# Patient Record
Sex: MALE | Race: WHITE | Employment: FULL TIME | URBAN - METROPOLITAN AREA
[De-identification: names, ages, dates, MRNs, and addresses within clinical notes are randomized per-mention and may not be internally consistent; named-entity substitution may affect disease eponyms.]

---

## 2024-02-07 ENCOUNTER — OFFICE VISIT (OUTPATIENT)
Dept: NEUROLOGY | Age: 29
End: 2024-02-07

## 2024-02-07 VITALS
HEART RATE: 91 BPM | OXYGEN SATURATION: 96 % | SYSTOLIC BLOOD PRESSURE: 118 MMHG | WEIGHT: 258 LBS | DIASTOLIC BLOOD PRESSURE: 78 MMHG

## 2024-02-07 DIAGNOSIS — G43.109 OCULAR MIGRAINE: Primary | ICD-10-CM

## 2024-02-07 DIAGNOSIS — Z75.8 DOES NOT HAVE PRIMARY CARE PROVIDER: ICD-10-CM

## 2024-02-07 RX ORDER — RIMEGEPANT SULFATE 75 MG/75MG
75 TABLET, ORALLY DISINTEGRATING ORAL
Qty: 9 TABLET | Refills: 3 | Status: SHIPPED | OUTPATIENT
Start: 2024-02-07 | End: 2024-02-07

## 2024-02-07 ASSESSMENT — ENCOUNTER SYMPTOMS
EYE REDNESS: 0
EYE PAIN: 0
PHOTOPHOBIA: 1
EYE DISCHARGE: 0
NAUSEA: 1
VOMITING: 0

## 2024-02-07 NOTE — ASSESSMENT & PLAN NOTE
Increased frequency of ocular migraines. Will repeat MRI Brain. He will attempt to obtain previous MRI reports as they were performed in [place], unsure location    Keep frequency of symptoms. D/C Rizatriptan. Contraindicated to migraine with aura and he is also having side effects from taking.     Provided samples of Nurtec. 1 tablet at onset of migraine, max of 1 tablet in 24 hours.

## 2024-02-07 NOTE — PROGRESS NOTES
Lloyd Cohen is a 28 y.o. male who presents with headaches.        CC: Headache        Referred by   Pao Lucas PA-C      HPI:        He describes unilateral right sided vision loss, first episode 7 years ago, followed by a headache. Occasional headaches in betweenthese times  but resolve with excedrin migraine in 20 minutes. In October 2023, experienced right sided vision loss. Followed by headache on contralateral side. No redness to eyes, nasal drainage, eye drainage. He had experienced this previously so did not seek treatment until it increased in frequency. Occurred 3-4 x in October. He saw urgent care who prescribed him rizatriptan. Did have an MRI when these first developed and states this was normal.       In 2024, last occurred 2/5 and the end of Jan. This year, experienced left sided vision loss with right sided headache. He has not experienced left sided visual loss in the past. Triggers are prolonged computer (has always occurred after a long work day)      He experienced a \"fuzzy/hot feeling\" which preceeded his visual loss by approx. 20-25 minutes. Took a rizatriptan which resolved visual symptoms in 20 minutes. The most recent headache, occurred at work. He had an energy drink and was concerned about taking the rizatriptan with this so waited an hour per pharmacist recommendation and symptoms progressed and didn't resolve until 9pm.       Experienced photophobia, phonophobia, nausea, no vomiting, & dizziness.       If he has a headache, coughing/moving exacerbates his pain.     In total, has occurred 7x in 5-6 months.     Vapes daily, has decreased use of this. Higher stress levels in recent months. Planning on an upcoming career change.       Requesting referral to PCP to establish care    P O U N D    HEADACHE     5 / 5.     P ulsatile :  yes    O nset :  yes      Unilateral :  yes        N ausea / vomit :  yes         D ebility :  yes  Positive = 3 or more.    For migraine type

## 2024-02-08 ENCOUNTER — TELEPHONE (OUTPATIENT)
Dept: NEUROLOGY | Age: 29
End: 2024-02-08

## 2024-06-14 ENCOUNTER — TELEPHONE (OUTPATIENT)
Dept: NEUROLOGY | Age: 29
End: 2024-06-14